# Patient Record
(demographics unavailable — no encounter records)

---

## 2024-10-08 NOTE — SOCIAL HISTORY
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

## 2024-10-08 NOTE — HISTORY OF PRESENT ILLNESS
[de-identified] : Oscar is a 2month old infant here for an initial consultation for sickle cell anemia.  She was diagnosed through  screening.  Mom knows she carries sickle cell trait; father does not know.  Mom has heard about sickle cell disease before, but doesn't know of anyone with sickle cell disease.   Oscar has been doing well since birth; no complaints.  On formula, taking 3-4 oz every 3 4-hours.  [de-identified] : Oscar is a 7-month-old infant with sickle cell anemia. She is here for a follow-up visit.  She was admitted to the hospital in 8/2024 when she developed fever after her 6-month immunizations.  She has been doing well since then.  No fever, rhinorrhea or cough.  No swelling of the hands/feet.  No splenomegaly.   Compliant with penicillin; spitting up less than before. No other questions/concerns.

## 2024-10-08 NOTE — FAMILY HISTORY
[Healthy] : healthy [Black/] : Black/ [Age ___] : Age: [unfilled] [Half] : half sister [FreeTextEntry2] : HbAS; [place] [de-identified] : Stephon

## 2024-10-08 NOTE — PAST MEDICAL HISTORY
[At Term] : at term [place] [Normal Vaginal Route] : by normal vaginal route [None] : there were no delivery complications [Jaundice] : not jaundice [Phototherapy] : no phototherapy [Transfusion] : no transfusion [NICU] : no NICU [FreeTextEntry1] : 7lb 5oz

## 2024-10-08 NOTE — CONSULT LETTER
[Dear  ___] : Dear  [unfilled], [Consult Letter:] : I had the pleasure of evaluating your patient, [unfilled]. [Please see my note below.] : Please see my note below. [Consult Closing:] : Thank you very much for allowing me to participate in the care of this patient.  If you have any questions, please do not hesitate to contact me. [Sincerely,] : Sincerely, [FreeTextEntry2] : Dr[facility] [address] [facility] [place] [id number] [FreeTextEntry3] : Colleen Jay MD, FAAP Professor of Pediatrics [facility] [employer] at [facility] Associate Chief for Hematology Section Head, Sickle Cell Disease Division of Hematology/Oncology and Stem Cell Transplantation [facility] Tel: [phone] Fax: [phone] [email or web address]:[email or web address]

## 2025-01-15 NOTE — END OF VISIT
[Time Spent: ___ minutes] : I have spent [unfilled] minutes of time on the encounter which excludes teaching and separately reported services. information could not be obtained

## 2025-01-15 NOTE — CONSULT LETTER
[Dear  ___] : Dear  [unfilled], [Consult Letter:] : I had the pleasure of evaluating your patient, [unfilled]. [Please see my note below.] : Please see my note below. [Consult Closing:] : Thank you very much for allowing me to participate in the care of this patient.  If you have any questions, please do not hesitate to contact me. [Sincerely,] : Sincerely, [FreeTextEntry2] : [facility] [address] [facility] [place] [id number] [FreeTextEntry3] : Colleen Jay MD, FAAP Professor of Pediatrics [facility] [employer] at [facility] Associate Chief for Hematology Section Head, Sickle Cell Disease Division of Hematology/Oncology and Stem Cell Transplantation [facility] Tel: [phone] Fax: [phone] [email or web address]:[email or web address]

## 2025-01-15 NOTE — FAMILY HISTORY
[Healthy] : healthy [Black/] : Black/ [Age ___] : Age: [unfilled] [Half] : half sister [FreeTextEntry2] : HbAS; [place] [de-identified] : Stephon

## 2025-01-15 NOTE — SOCIAL HISTORY
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

## 2025-01-15 NOTE — HISTORY OF PRESENT ILLNESS
[de-identified] : Oscar is a 2month old infant here for an initial consultation for sickle cell anemia.  She was diagnosed through  screening.  Mom knows she carries sickle cell trait; father does not know.  Mom has heard about sickle cell disease before, but doesn't know of anyone with sickle cell disease.   Oscar has been doing well since birth; no complaints.  On formula, taking 3-4 oz every 3 4-hours.  [de-identified] : Oscar is an 21-zaixo-mnl infant with sickle cell anemia. She is here for a follow-up visit.  She has been doing well.  No fever, rhinorrhea or cough.  No swelling of the hands/feet.  No splenomegaly.   Compliant with penicillin. No other questions/concerns.

## 2025-05-27 NOTE — PHYSICAL EXAM
[Normal] : affect appropriate [de-identified] : nose congested [de-identified] : Bilateral A/E is good. Lungs are CTA.  [de-identified] : liver/spleen not palpable

## 2025-05-27 NOTE — HISTORY OF PRESENT ILLNESS
[de-identified] : Oscar is a 2month old infant here for an initial consultation for sickle cell anemia.  She was diagnosed through  screening.  Mom knows she carries sickle cell trait; father does not know.  Mom has heard about sickle cell disease before, but doesn't know of anyone with sickle cell disease.   Oscar has been doing well since birth; no complaints.  On formula, taking 3-4 oz every 3 4-hours.  [de-identified] : Oscar is a 59-ctmhs-hqs infant with sickle cell anemia (HbSS). She is here for a follow-up visit.  She was admitted to the hospital this past weekend with fever, rhinorrhea and cough.  RVP was positive for parainfluenza virus.  She was treated with iv ceftriaxone, blood culture was negative.  She was discharged home yesterday.  No fever since discharge but continues to have rhinorrhea and cough.  No swelling of the hands/feet.  No splenomegaly.   Compliant with penicillin. No other questions/concerns.

## 2025-05-27 NOTE — FAMILY HISTORY
[Healthy] : healthy [Black/] : Black/ [Age ___] : Age: [unfilled] [Half] : half sister [FreeTextEntry2] : HbAS; [place] [de-identified] : Stephon

## 2025-05-27 NOTE — REVIEW OF SYSTEMS
[Nasal Discharge] : nasal discharge [Anemia] : anemia [Cough] : cough [Negative] : Allergic/Immunologic [FreeTextEntry4] : as noted in history [FreeTextEntry1] : sickle cell anemia [FreeTextEntry6] : as noted in history

## 2025-05-27 NOTE — SOCIAL HISTORY
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

## 2025-05-27 NOTE — CONSULT LETTER
[Dear  ___] : Dear  [unfilled], [Consult Letter:] : I had the pleasure of evaluating your patient, [unfilled]. [Please see my note below.] : Please see my note below. [Consult Closing:] : Thank you very much for allowing me to participate in the care of this patient.  If you have any questions, please do not hesitate to contact me. [Sincerely,] : Sincerely, [FreeTextEntry2] : [facility] [address] [facility] [place] [id number] [FreeTextEntry3] : Colleen Jay MD, FAAP Professor of Pediatrics [facility] [employer] at [facility] Associate Chief for Hematology Section Head, Sickle Cell Disease Division of Hematology/Oncology and Stem Cell Transplantation [facility] Tel: [phone] Fax: [phone] e-mail:[email or web address]